# Patient Record
(demographics unavailable — no encounter records)

---

## 2024-10-17 NOTE — IMAGING
[Left] : left knee [All Views] : anteroposterior, lateral, skyline, and anteroposterior standing [Mild tricompartmental OA medial narrowing] : Mild tricompartmental OA medial narrowing [Mild tricompartmental OA lateral narrowing] : Mild tricompartmental OA lateral narrowing [Mild patellofemoral OA] : Mild patellofemoral OA

## 2024-10-17 NOTE — PHYSICAL EXAM
[Left] : left knee [NL (0)] : extension 0 degrees [5___] : hamstring 5[unfilled]/5 [Negative] : negative Dianne's [] : no pain with varus stress [TWNoteComboBox7] : flexion 135 degrees

## 2024-10-17 NOTE — HISTORY OF PRESENT ILLNESS
[9] : 9 [7] : 7 [Tightness] : tightness [Full time] : Work status: full time [de-identified] : 10/17/24: Here for f/up left knee. Reports more anterior pain today.  08/13/2024: 56-year-old female presenting with LT knee pain. No known injury/fall. Experiencing tight pain behind the knee for 2 weeks. Most pain when sitting to standing and bending.   Occ: CNA  PMHX: prediabetic (fingersticks around ~90)  [] : no [FreeTextEntry5] : CSI/ASP last visit with relief. Reports new sharp pain for 2 weeks.  [de-identified] : CNA

## 2024-10-17 NOTE — DISCUSSION/SUMMARY
[de-identified] : 57yo F with acute exacerbation of left knee OA and with pes bursitis  1) voltaren cream rx. discsussed r/b/a  2) meloxicam rx. discussed r/b/a  3) cryotherapy, rest and activity modification 4) rtc 3-4 weeks

## 2025-01-30 NOTE — DISCUSSION/SUMMARY
[de-identified] : 57yo F with acute exacerbation of left knee OA and with pes bursitis  The patient was advised of the diagnosis. The natural history of the pathology was explained in full to the patient in layman's terms. All questions were answered. 1) voltaren cream rx. discsussed r/b/a  2) physical therapy  3) cryotherapy, rest and activity modification  4) rtc prn

## 2025-01-30 NOTE — HISTORY OF PRESENT ILLNESS
[9] : 9 [7] : 7 [Tightness] : tightness [Full time] : Work status: full time [FreeTextEntry5] : CSI/ASP last visit with relief. Reports new sharp pain for 2 weeks.  [de-identified] : 1/28/25: Here to f/up left knee. He reports continued left knee pain and stiffness.  10/17/24: Here for f/up left knee. Reports more anterior pain today.  08/13/2024: 56-year-old female presenting with LT knee pain. No known injury/fall. Experiencing tight pain behind the knee for 2 weeks. Most pain when sitting to standing and bending.   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

## 2025-01-30 NOTE — DISCUSSION/SUMMARY
[de-identified] : 55yo F with acute exacerbation of left knee OA and with pes bursitis  The patient was advised of the diagnosis. The natural history of the pathology was explained in full to the patient in layman's terms. All questions were answered. 1) voltaren cream rx. discsussed r/b/a  2) physical therapy  3) cryotherapy, rest and activity modification  4) rtc prn

## 2025-01-30 NOTE — HISTORY OF PRESENT ILLNESS
[9] : 9 [7] : 7 [Tightness] : tightness [Full time] : Work status: full time [FreeTextEntry5] : CSI/ASP last visit with relief. Reports new sharp pain for 2 weeks.  [de-identified] : 1/28/25: Here to f/up left knee. He reports continued left knee pain and stiffness.  10/17/24: Here for f/up left knee. Reports more anterior pain today.  08/13/2024: 56-year-old female presenting with LT knee pain. No known injury/fall. Experiencing tight pain behind the knee for 2 weeks. Most pain when sitting to standing and bending.   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

## 2025-01-30 NOTE — HISTORY OF PRESENT ILLNESS
[9] : 9 [7] : 7 [Tightness] : tightness [Full time] : Work status: full time [FreeTextEntry5] : CSI/ASP last visit with relief. Reports new sharp pain for 2 weeks.  [de-identified] : 1/28/25: Here to f/up left knee. He reports continued left knee pain and stiffness.  10/17/24: Here for f/up left knee. Reports more anterior pain today.  08/13/2024: 56-year-old female presenting with LT knee pain. No known injury/fall. Experiencing tight pain behind the knee for 2 weeks. Most pain when sitting to standing and bending.   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

## 2025-01-30 NOTE — DISCUSSION/SUMMARY
[de-identified] : 57yo F with acute exacerbation of left knee OA and with pes bursitis  The patient was advised of the diagnosis. The natural history of the pathology was explained in full to the patient in layman's terms. All questions were answered. 1) voltaren cream rx. discsussed r/b/a  2) physical therapy  3) cryotherapy, rest and activity modification  4) rtc prn